# Patient Record
(demographics unavailable — no encounter records)

---

## 2025-01-03 NOTE — HISTORY OF PRESENT ILLNESS
[[email or web address]] : CC: toe nail disfigured  Pt is a runner and about 2 weeks ago noted some thickening and lifting of the toenail of the second toe on the right foot. prior to this there were some blisters there.